# Patient Record
Sex: FEMALE | URBAN - METROPOLITAN AREA
[De-identification: names, ages, dates, MRNs, and addresses within clinical notes are randomized per-mention and may not be internally consistent; named-entity substitution may affect disease eponyms.]

---

## 2019-10-05 ENCOUNTER — OFFICE VISIT (OUTPATIENT)
Dept: URBAN - METROPOLITAN AREA CLINIC 22 | Facility: CLINIC | Age: 34
End: 2019-10-05
Payer: COMMERCIAL

## 2019-10-05 DIAGNOSIS — H52.13 MYOPIA, BILATERAL: Primary | ICD-10-CM

## 2019-10-05 PROCEDURE — 92310 CONTACT LENS FITTING OU: CPT | Performed by: OPTOMETRIST

## 2019-10-05 PROCEDURE — 92014 COMPRE OPH EXAM EST PT 1/>: CPT | Performed by: OPTOMETRIST

## 2019-10-05 ASSESSMENT — KERATOMETRY
OD: 46.59
OS: 44.44

## 2019-10-05 ASSESSMENT — INTRAOCULAR PRESSURE
OS: 22
OD: 21

## 2019-10-05 ASSESSMENT — VISUAL ACUITY
OS: 20/20
OD: 20/20

## 2019-10-05 NOTE — IMPRESSION/PLAN
Impression: Myopia, bilateral: H52.13. OU. Plan: Dispensed new Rx, discussed changes with patient. review signs or retinal detachment.